# Patient Record
Sex: MALE | Race: BLACK OR AFRICAN AMERICAN
[De-identification: names, ages, dates, MRNs, and addresses within clinical notes are randomized per-mention and may not be internally consistent; named-entity substitution may affect disease eponyms.]

---

## 2018-04-11 ENCOUNTER — HOSPITAL ENCOUNTER (INPATIENT)
Dept: HOSPITAL 92 - ERS | Age: 58
LOS: 2 days | Discharge: HOME | DRG: 54 | End: 2018-04-13
Attending: NEUROLOGICAL SURGERY | Admitting: NEUROLOGICAL SURGERY
Payer: COMMERCIAL

## 2018-04-11 ENCOUNTER — HOSPITAL ENCOUNTER (EMERGENCY)
Dept: HOSPITAL 18 - NAV ERS | Age: 58
Discharge: ANOTHER HOSPITAL | End: 2018-04-11
Payer: COMMERCIAL

## 2018-04-11 VITALS — BODY MASS INDEX: 24.3 KG/M2

## 2018-04-11 DIAGNOSIS — I11.9: ICD-10-CM

## 2018-04-11 DIAGNOSIS — G93.6: Primary | ICD-10-CM

## 2018-04-11 DIAGNOSIS — Z79.899: ICD-10-CM

## 2018-04-11 DIAGNOSIS — R90.0: ICD-10-CM

## 2018-04-11 DIAGNOSIS — J30.9: ICD-10-CM

## 2018-04-11 DIAGNOSIS — G47.30: ICD-10-CM

## 2018-04-11 DIAGNOSIS — I10: ICD-10-CM

## 2018-04-11 DIAGNOSIS — G93.6: ICD-10-CM

## 2018-04-11 DIAGNOSIS — C71.0: Primary | ICD-10-CM

## 2018-04-11 LAB
ALBUMIN SERPL BCG-MCNC: 4.4 G/DL (ref 3.5–5)
ALP SERPL-CCNC: 63 U/L (ref 40–150)
ALT SERPL W P-5'-P-CCNC: 13 U/L (ref 8–55)
ANION GAP SERPL CALC-SCNC: 15 MMOL/L (ref 10–20)
APTT PPP: 29.9 SEC (ref 22.9–36.1)
AST SERPL-CCNC: 16 U/L (ref 5–34)
BASOPHILS # BLD AUTO: 0.1 THOU/UL (ref 0–0.2)
BASOPHILS NFR BLD AUTO: 1.6 % (ref 0–1)
BILIRUB SERPL-MCNC: 1 MG/DL (ref 0.2–1.2)
BUN SERPL-MCNC: 19 MG/DL (ref 8.4–25.7)
CALCIUM SERPL-MCNC: 9.6 MG/DL (ref 7.8–10.44)
CHLORIDE SERPL-SCNC: 108 MMOL/L (ref 98–107)
CK MB SERPL-MCNC: 1.5 NG/ML (ref 0–6.6)
CO2 SERPL-SCNC: 22 MMOL/L (ref 22–29)
CREAT CL PREDICTED SERPL C-G-VRATE: 0 ML/MIN (ref 70–130)
EOSINOPHIL # BLD AUTO: 0 THOU/UL (ref 0–0.7)
EOSINOPHIL NFR BLD AUTO: 0.8 % (ref 0–10)
GLOBULIN SER CALC-MCNC: 3.6 G/DL (ref 2.4–3.5)
GLUCOSE SERPL-MCNC: 114 MG/DL (ref 70–105)
HGB BLD-MCNC: 11.1 G/DL (ref 14–18)
INR PPP: 1.1
LYMPHOCYTES # BLD AUTO: 1.4 THOU/UL (ref 1.2–3.4)
LYMPHOCYTES NFR BLD AUTO: 22.5 % (ref 21–51)
MCH RBC QN AUTO: 27 PG (ref 27–31)
MCV RBC AUTO: 79 FL (ref 80–94)
MONOCYTES # BLD AUTO: 0.5 THOU/UL (ref 0.11–0.59)
MONOCYTES NFR BLD AUTO: 7.4 % (ref 0–10)
NEUTROPHILS # BLD AUTO: 4.1 THOU/UL (ref 1.4–6.5)
NEUTROPHILS NFR BLD AUTO: 67.8 % (ref 42–75)
PLATELET # BLD AUTO: 223 THOU/UL (ref 130–400)
POTASSIUM SERPL-SCNC: 3.5 MMOL/L (ref 3.5–5.1)
PROTHROMBIN TIME: 14 SEC (ref 12–14.7)
RBC # BLD AUTO: 4.1 MILL/UL (ref 4.7–6.1)
SODIUM SERPL-SCNC: 141 MMOL/L (ref 136–145)
TROPONIN I SERPL DL<=0.01 NG/ML-MCNC: (no result) NG/ML (ref ?–0.03)
WBC # BLD AUTO: 6.1 THOU/UL (ref 4.8–10.8)

## 2018-04-11 PROCEDURE — 36416 COLLJ CAPILLARY BLOOD SPEC: CPT

## 2018-04-11 PROCEDURE — 74177 CT ABD & PELVIS W/CONTRAST: CPT

## 2018-04-11 PROCEDURE — 71045 X-RAY EXAM CHEST 1 VIEW: CPT

## 2018-04-11 PROCEDURE — 82553 CREATINE MB FRACTION: CPT

## 2018-04-11 PROCEDURE — 80053 COMPREHEN METABOLIC PANEL: CPT

## 2018-04-11 PROCEDURE — 70450 CT HEAD/BRAIN W/O DYE: CPT

## 2018-04-11 PROCEDURE — 85025 COMPLETE CBC W/AUTO DIFF WBC: CPT

## 2018-04-11 PROCEDURE — 71260 CT THORAX DX C+: CPT

## 2018-04-11 PROCEDURE — C9113 INJ PANTOPRAZOLE SODIUM, VIA: HCPCS

## 2018-04-11 PROCEDURE — 85730 THROMBOPLASTIN TIME PARTIAL: CPT

## 2018-04-11 PROCEDURE — 36415 COLL VENOUS BLD VENIPUNCTURE: CPT

## 2018-04-11 PROCEDURE — 84484 ASSAY OF TROPONIN QUANT: CPT

## 2018-04-11 PROCEDURE — 96374 THER/PROPH/DIAG INJ IV PUSH: CPT

## 2018-04-11 PROCEDURE — 93005 ELECTROCARDIOGRAM TRACING: CPT

## 2018-04-11 PROCEDURE — 96375 TX/PRO/DX INJ NEW DRUG ADDON: CPT

## 2018-04-11 PROCEDURE — A4216 STERILE WATER/SALINE, 10 ML: HCPCS

## 2018-04-11 PROCEDURE — 85610 PROTHROMBIN TIME: CPT

## 2018-04-11 PROCEDURE — 70553 MRI BRAIN STEM W/O & W/DYE: CPT

## 2018-04-11 RX ADMIN — FAMOTIDINE SCH: 10 INJECTION, SOLUTION INTRAVENOUS at 21:16

## 2018-04-11 NOTE — CT
NONCONTRAST HEAD CT:

 

History: Altered mental status. Right sided weakness. 

 

Comparison: 4-11-18 

 

Technique: Noncontrast head CT is performed from skull base to skull vertex.

 

FINDINGS: 

Re-demonstration of intrinsic hyperdensity involving the left and right frontal lobe, compatible with
 areas of hemorrhage. There is evidence of vasogenic edema in the left frontal lobe and to a lesser e
xtent right frontal lobe. Multifocal hemorrhagic metastases are favored. There is asymmetric dilatati
on of the frontal horn of the left lateral ventricle. There may be some intraventricular blood. There
 is evidence of left to right subfalcine herniation anteriorly, measuring 9 mm. Degree of herniation 
has slightly progressed. Basilar cisterns are still patent. There may be a small amount of subarachno
id blood along the left frontal convexities. Calvarium is intact. 

 

IMPRESSION: 

1. Redemonstration of intraparenchymal hemorrhage, vasogenic edema. Findings are worrisome for possib
le metastatic lesions (hemorrhagic mets). 

2. Progression of left to right subfalcine herniation.

3. Results of study discussed with JENNIFER Leslie nurse 4-11-18 at 3:32 p.m.

 

POS: [facility]

## 2018-04-11 NOTE — CT
CT BRAIN:

 

History: 58-year-old with history of weakness and jerking for several hours. 

 

Technique: Noncontrast enhanced CT images of the brain obtained. 

 

FINDINGS: 

Images demonstrate an area of calcification and possible hemorrhage involving the deep white matter o
f both frontal lobes. There is approximately 6.7 mm of left to right shift. There is edema in the allyn
p white matter extending into the left frontal lobe into the subcortical white matter tracts. I am co
ncerned that this is an area of malignancy including glioblastoma. I do not see definite evidence of 
gray matter wedge like density to suggest acute strokes. I do recommend pre and post contrast enhance
d MRI of the brain to further characterize this.

 

Findings discussed with Dr. Matute at 12:58 p.m. on 04/11/2018.

 

IMPRESSION: 

Deep white matter and frontal lobe areas of white matter edema with area of possible calcifications. 
Differential diagnosis includes neoplasia. There is some left to right shift seen. Correlate with pre
 and post contrast enhanced MRI images of the brain.

 

CODE CR

 

POS: [facility]

## 2018-04-11 NOTE — HP
HISTORY OF PRESENT ILLNESS:  Mr. Nicole is a 58-year-old man who was at work today under normal condi
tions and in his normal state of health when coworker and he started to notice that he was not feelin
g particularly well.  He felt a headache and started to have some minor difficulty with speech.  He w
as taken to the emergency department in [facility] wher
e he was then transferred here to [facility] for continued decline in neurologic status and 
EMS transport and towards the end of his stay in [place].  He was experiencing right upper extremity
 and right lower extremity weakness and motor dysfunction.  In the emergency department here, a CT sc
an was obtained that revealed bilateral deep frontal calcifications and edema, which very well could 
represent neoplastic process with calcifications or perhaps parafalcine meningioma or some other type
 of mass.  In any case, there is a left to right shift of around 6 mm greatest distance at the bedsid
e.

 

PAST MEDICAL HISTORY:  Only significant for hypertension, sleep apnea, hypertensive heart disease, an
d allergic rhinitis.

 

CURRENT MEDICATIONS:  Lisinopril/hydrochlorothiazide.

 

ALLERGIES:  No known drug allergies.

 

PAST SURGICAL HISTORY:  None.

 

FAMILY HISTORY:  Father  with cancer and mother alive with hypertension.

 

SOCIAL HISTORY:  Denies tobacco use.

 

PHYSICAL EXAMINATION:  The patient is alert.  He is nonverbal at the moment other than muttering some
 non-intelligible words.  He does not follow many commands, but will squeeze my hand on occasion when
 asked.  He can raise his left leg off the table minimally grading strength about 4-/5.  He is unable
 to raise his right leg.  He has a tremor in the right lower extremity and in the right upper extremi
ty.  He does squeeze my hand with the right upper extremity and the left upper extremity.  Cranial ne
rve VII is intact as is III.  He does not follow commands for my extraocular movement examination, bu
t he is looking around the room on his own accord.  Pupils are equally round and reactive to light.  
There is no deviation of the tongue.  He is diaphoretic at bedside and tachycardic.

 

ASSESSMENT:  Intracranial edema and bilateral frontal lobe abnormality with calcifications versus pos
sible intracerebral hemorrhage, though less likely.

 

PLAN:  At this time, we will obtain an MRI with and without contrast in the emergency department to h
elp us better establish what his intracranial pathology is.  We will also admit him to the critical c
are unit with q.1 hour neuro checks, head of the bed elevated to 30 degrees, and start a regimen of D
ecadron.  I will discuss with Dr. Mcghee as well, adding seizure prophylaxis, given this tremor that 
is presented in the way of Dilantin.  After reviewing the MRI, I will likely involve the hospitalist 
and potentially consult with Oncology depending on pathology.

 

Ady Chávez PA-C, dictating under Dr. Mcghee.

## 2018-04-11 NOTE — MRI
BRAIN MRI WITH AND WITHOUT CONTRAST

4/11/18

 

Reference made to head CT exams from same date. 

 

CLINICAL HISTORY: 

Abnormal intracranial findings with recent reported history of altered mental status and right sided 
weakness. 

 

FINDINGS:  

There is an irregular enhancing mass occupying the left lateral ventricle which contains cystic compo
nent. Mass spans the near entirety of the AP dimension of the body in the left lateral ventricle and 
does result in obstructive  dilatation of the left frontal horn as well as thin linear ependymal enha
ncement of this region. The left frontal horn also demonstrates altered signal of the CSF. Mass is lo
bular in morphology spanning AP dimension of approximately 4 cm when including each of the bilobed co
mponents, and a craniocaudal dimension also spanning approximately 4 cm. There is satellite nodularit
y with rim enhancing nodule seen contralaterally, involving the medial right frontal lobe within the 
level of the centrum semiovale of 6-7 mm. There is extensive vasogenic edema more notable within the 
left cerebral hemisphere which occupies the frontoparietal lobes. There is mild increased signal with
in the region of signal abnormality on DWI indicating component of mild restriction, although there i
s significant component of T2 shine through as well. Susceptibility foci are seen corresponding to hy
perdensity on preceding CT favoring calcific component, particularly at and to the left of midline gi
lila the CT appearance. There is rightward subfalcine herniation approximately 7-8 mm to the right of 
midline as direct result of mass effect. 

 

IMPRESSION:  

Large cystic and solid mass, epicenter within left lateral ventricle, with associated obstructive dil
atation and ependymitis involving the left frontal horn. Satellite nodularity is seen contralaterally
. There is extensive vasogenic edema involving the bilateral cerebral hemispheres. Given intrinsic hy
perdensity on preceding CT, as well as susceptibility on the current exam, this favors a calcific com
ponent. As a result, malignancy is the diagnosis of exclusion, which may either relate to entity such
 as intraventricular GBM, or choroid plexus carcinoma. Metastatic disease is a consideration. Infecti
ous process is considered less likely although is not entirely excluded, particularly in light of the
 linear enhancement indicating ependymitis of the frontal horn left lateral ventricle, and the small 
ring enhancing lesion. Recommend clinical correlation in this regard. Due to the degree of intracrani
al mass effect, and significant edema, urgent neurosurgical consultation is warranted. 

 

POS: ADILIA

## 2018-04-12 RX ADMIN — FAMOTIDINE SCH: 10 INJECTION, SOLUTION INTRAVENOUS at 11:16

## 2018-04-12 RX ADMIN — Medication SCH ML: at 09:45

## 2018-04-12 NOTE — PRG
DATE OF SERVICE:  04/12/2018

 

This is a 50 minute initial hospital visit note in which 50 minutes were spent in review the imaging,
 record, evaluation, examination of the patient, and formulation of a plan.  Greater than 50% of the 
time was spent in counseling on Andre Nicole.  

 

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

 

HISTORY OF PRESENT ILLNESS:  Mr. Nicole is 58-year-old man with the abrupt onset of weakness in the r
ight upper extremity and right lower extremity.  He also had speech abnormality.  Head CT demonstrate
d an isodense mass deep in the region of the genun body of the corpus callosum and a small amount of 
hyperdensity and calcium.  MRI demonstrated some evidence of hemorrhage, but more of findings of mult
ifocal and multicentric contrast enhancing lesion with significant left greater than right edema.  He
 has improved on Decadron.  My suspicion is this is likely a secondary glioma that has transitioned f
rom perhaps an oligodendroglioma given the calcification to a glioblastoma.  There is also enhancemen
t along the ependyma and mass effect.  He has developed a tremor as well.  There was concern that the
re was a seizure and he has been placed on Keppra, which we have not seen any clinical seizure activi
ty.

 

PHYSICAL EXAMINATION:

GENERAL:  He is alert, appropriate.  His speech appears to be fluent.  He is antigravity in both uppe
r and lower extremities with mild weakness in the right upper and lower extremity.  This is certainly
 an improvement according to he and his family.

 

IMPRESSION AND PLAN:  He has been on aspirin.  This is not a surgically resectable lesion, but perhap
s amenable to stereotactic needle biopsy; however, he would need to be off his aspirin given the rece
nt hemorrhage and if he does well over the course of today I will likely do a CT scan of the chest, a
bdomen, and pelvis today to make sure there is not a metastatic source, although I let him know that 
the 3 lesions that mainly involve the corpus callosum are the glioblastoma lymphoma and tumefactive M
S.

 

My suspicion is again this is a glioblastoma.  He is likely too young for lymphoma and 1 old for mult
iple sclerosis.  I would like to plan another night in the hospital and if he is doing okay plan to d
ismiss him with follow up in my clinic.

 

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

## 2018-04-12 NOTE — CT
CT OF THE CHEST WITH CONTRAST 

CT OF THE ABDOMEN AND PELVIS WITH CONTRAST:

 

Date:  04/12/18 

 

COMPARISON:  

04/11/18. 

 

HISTORY:  

Intracranial mass. This could represent a primary neoplasm of the brain or metastatic disease to the 
brain. Evaluate for primary tumor in the chest, abdomen, or pelvis. 

 

TECHNIQUE:  

1.  Multiple contiguous axial images were obtained in a CT of the chest with contrast. Coronal reform
ats were performed. 

 

2.  Multiple contiguous axial images were obtained in a CT of the abdomen and pelvis with contrast. P
O contrast was administered. Coronal reformats were performed. 

 

FINDINGS:

 

CT CHEST:

The heart is normal in size without focal cardiac abnormality. No hilar or mediastinal lymphadenopath
y seen. No suspicious pulmonary nodules are seen. No pneumothorax or pleural effusions are seen. The 
chest wall soft tissues are unremarkable. The bones of the thorax are unremarkable. 

 

CT ABDOMEN/PELVIS:

The liver, gallbladder, kidneys, adrenal glands, spleen, and pancreas are unremarkable. No free air, 
free fluid, or stranding changes are seen in the abdomen or pelvis. 

 

The large and small bowel are unremarkable. The appendix is unremarkable. No abdominal or pelvic lymp
hadenopathy are seen. 

 

There is a mixed sclerotic/lytic lesion in the L2 vertebral body which may represent end plate degene
rative changes. No other significant osseous lesions are identified. A small bone island is seen in t
he left pelvis. 

 

The abdominal wall soft tissues are unremarkable. 

 

IMPRESSION: 

1.  No evidence of intrathoracic primary malignancy. 

2.  No evidence of acute intraabdominal/pelvic abnormality. 

3.  There is a lesion in the L2 vertebral body which likely represents end plate degenerative change,
 but this is nonspecific on CT. 

 

 

POS: [facility]ILIA

## 2018-04-12 NOTE — CON
DATE OF CONSULTATION:  04/12/2018

 

This is a 58-year-old pleasant  gentleman from [place] who presented with slurred sp
eech, weakness of his right side.

 

His initial CT of his brain showed areas of hemorrhage, both frontal lobes with some edema.  Most of 
it appeared in the left frontal lobe area.

 

He then underwent an MRI of his brain which showed a cystic solid mass in the left lateral ventricle 
associated obstructive dilatation.  Extensive vasogenic edema involving bilateral cerebral hemisphere
s.

 

He was then started on Dilantin.

 

There was some concern that this could be either primary versus metastatic disease.  The patient is a
 nonsmoker, no alcohol abuse.  He is relatively asymptomatic, in fact, I remember the last time he wa
s in the hospital.

 

PAST MEDICAL HISTORY:  Hypertension, sleep apnea, noncompliant.

 

MEDICATIONS:  Lisinopril.  Wife states CPAP machine at home unused for years.

 

Though he did see a sleep doctor somewhere down the line.   Unclear where he saw that.

 

SOCIAL HISTORY:  He is an  in [place].

 

REVIEW OF SYSTEMS:  Otherwise, 10 point negative.

 

PHYSICAL EXAMINATION:

GENERAL:  Awake, alert, responsive.  Speech is slightly slurred neurologically.

HEENT:  Pupils are equal.  He moves all 4 extremities.  

EXTREMITIES:  No edema.

VITAL SIGNS:  Sats are 100% on room air, blood pressure 124/81, respiration rate 18, afebrile.

CHEST:  Reveals no wheezing or crackles.

CARDIAC:  Normal S1-S2.  No gallops.

ABDOMEN:  Soft.  No masses.  

 

LABORATORY:  Creatinine 1.32.  White count 6,  H&H 11 and 32.  Electrolytes are normal.  

 

X-ray was normal.  MRI as noted. 

 

IMPRESSION:

1.  Extensive cerebral edema associated with presumably a neoplastic process.

2.  Nonsmoker with hypertension.

3.  Mild renal failure.

 

PLAN:  Continue Decadron.  Continue supportive care.  Keppra.  Await input from Neurosurgery.  We darleen
l follow while in the ICU.

## 2018-04-12 NOTE — CON
DATE OF CONSULTATION:  2018

 

REASON FOR CONSULTATION:  Mr. Nicole is a 58-year-old gentleman who has been diagnosed with a maligna
nt appearing left side a brain mass.  I have been asked by Dr. Mcghee to see him to discuss diagnosis
 and possible treatment options.

 

HISTORY OF PRESENT ILLNESS:  Mr. Nicole was doing well until yesterday at work, he noticed that he wa
s not thinking well.  He apparently was having more difficulty with his speech.  He also started to d
evelop some right-sided motor dysfunction and weakness.  He was brought to the emergency room at [facility] and he underwent a CT scan of the head, which showed a mass that had some areas of calcification
 and possible hemorrhage.  He was subsequently admitted to [facility] here for workup and evaluation.
  He was started on dexamethasone.  His symptoms did improve.  He underwent an MRI of the brain, whic
h showed an irregular enhancing mass occupying the left lateral ventricle.  There was some dilatation
 of the left frontal horn as well as a thin linear ependymal enhancement in that region.  The mass ap
peared to cross the corpus callosum.  There were satellite nodularity on the contralateral side.  The
re was significant vasogenic edema.  Concern was for a malignant brain tumor.  The patient was seen b
y Dr. Mcghee.  A CT scan of the chest, abdomen, and pelvis were performed, which showed no evidence o
f primary site.  There was a sclerotic focus in the L2 vertebral body, which was felt to be from dege
nerative changes.  Dr. Mcghee has talked to him about a possible sterotactic needle biopsy.  I am see
ing the patient today to discussed his options also.  He is much improved with the dexamethasone.  He
 was having tremors and these are improved both with the Keppra and dexamethasone.  He states that he
 is able to walk now.  He still has some difficulties with his speech, but does express himself fairl
y clearly.  He denies any headaches prior to his admission.  He has no nausea or vomiting.  He voices
 no other complaints.

 

PAST MEDICAL HISTORY:

1.  Hypertension.

2.  He denies other medical or surgical problems.

 

MEDICATIONS:  On admission were lisinopril.  He now is on dexamethasone and Keppra, and Protonix.

 

ALLERGIES:  No known medical allergies.

 

SOCIAL HISTORY:  He lives with his wife in [place], [place].  He works as a  for [employer] in [place].  He has no cigarette or alcohol use.

 

FAMILY HISTORY:  His father  at age 63 from what was possibly metastatic colon cancer.  His mothe
r still living at age 89 with some heart condition.

 

REVIEW OF SYSTEMS:  Twelve system review of systems otherwise negative.

 

PHYSICAL EXAMINATION:

VITAL SIGNS:  Height 6 feet, weight 179 pounds, blood pressure is 141/100, pulse is 108, respirations
 are 20, temperature 98.4, O2 saturation 97%.

GENERAL:  He is alert and oriented and in no apparent distress.  He is well-developed and well-nouris
hed.  Karnofsky performance status is an 80%.

HEENT:  Eyes:  Pupils are equal, round, reactive to light.  Extraocular movements are intact.  ENT:  
Oral cavity and oropharynx normal without lesion or erythema.  Palate elevates symmetrically.  Gingiv
a is intact.

NECK:  Supple, without cervical or supraclavicular adenopathy.  No thyromegaly.  Larynx midline.

LUNGS:  Breathing nonlabored.  Clear to auscultation and percussion.

HEART:  Regular rhythm without murmur.  No lower extremity edema.

BACK:  No tenderness on fist percussion of his spine.

LYMPHATIC:  No axillary or inguinal adenopathy.

ABDOMEN:  Bowel sounds present.  Soft, nontender, nondistended, without mass or hepatosplenomegaly.  
Liver percussed to normal size.

SKIN:  Without rash or purpura.

NEUROLOGIC:  Cranial nerves II-XII grossly intact.  Motor strength is 5/5 in both upper and lower ext
remities in all muscle groups tested.  He does have still some occasional tremors in the right upper 
extremity.  He is hyperreflexic on the right side.  Gait was not tested.

 

RADIOLOGIC:  MRI of the brain is personally reviewed and discussed above.  He has an enhancing mass t
hat is deep in the left cerebrum and crossing the corpus callosum.  He has some satellite nodularity 
on the contralateral side.  He does have some ependymal enhancement in the left frontal horn.  CT sca
n of the head was personally reviewed and again shows some calcification and possible hemorrhage.  CT
 of the chest, abdomen, and pelvis was also personally reviewed, which showed no evidence of primary 
site.  He has cirrhotic focus in the L2 vertebral body which is felt to be degenerative in nature.

 

LABORATORY DATA:  CBC revealed a white blood count of 6100 with hemoglobin of 11.1, hematocrit of 32.
4, platelet count 223,000.  Chemistry group showed a creatinine of 1.32.  Otherwise, electrolytes wer
e normal.

 

ASSESSMENT:  Mr. Nicole is a 58-year-old gentleman who appears to have a malignancy involving the bra
in.  Likely this is a primary brain tumor.  Given that there is no evidence of other primary site on 
his staging workup.  Possibilities include glioblastoma versus a lymphoma.  Dr. Mcghee suggest there 
is a small chance this could be from multiple sclerosis, although he thinks that is not likely the ca
se.  By imaging, this does appear to be a primary brain tumor.  His symptoms are better with the init
iation of dexamethasone.

 

PLAN:  I had a discussion with Mr. Nicole and his wife regarding his situation and probable diagnosis
.  I agree with Dr. Mcghee that we need to establish tissue diagnosis.  Dr. Mcghee is planning a ster
otactic biopsy.  He is not a candidate for surgical resection because the lesion is too deep.  A ster
otactic biopsy would help us to determine a diagnosis.  We can then determine how best to treat him. 
 If this is a glioblastoma that he is likely going to be treated with chemotherapy and radiation.  If
 this were a lymphoma, then he may be treated with chemotherapy first.  Possibly followed by deb
n.  Of course, if this were a benign then he would not be treated with chemotherapy, radiation, but i
s very unlikely that is the case.  I had a discussion with him and his wife regarding the possibility
 of radiation, the logistics of radiation, and the benefits and risks of potentially.  This is not th
e benefits and risks were not discussed in depth as we have not yet made a tissue diagnosis.  However
, we did discuss the logistics of radiation in depth.  He lives in [place].  It is his preference to
 get his radiation therapy in [place].  Since that is much closer to his house, if it does come
 to needing that as it likely will.  Time was taken to answer all their questions regarding his situa
tion and possible treatment options in the future.

 

Thank you for this interesting consultation.

## 2018-04-13 VITALS — SYSTOLIC BLOOD PRESSURE: 152 MMHG | DIASTOLIC BLOOD PRESSURE: 86 MMHG

## 2018-04-13 VITALS — TEMPERATURE: 98.6 F

## 2018-04-13 RX ADMIN — Medication SCH ML: at 09:59

## 2018-04-13 NOTE — PRG
DATE OF SERVICE:  04/13/2018

 

This is a 15-minute subsequent visit note in which 15 minutes were spent in reviewing the imaging rec
ord, evaluation, examination of patient, and formulation of plan.  Greater than 50% of the time was s
pent counseling on Mr. Andre Nicole.  Mr. Nicole is hospital day #2, following admission for what I 
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

 

He has very minimal weakness in the right upper and lower extremity.  This is definitely improved wit
h the initiation of Decadron.  Dr. Robbins has seen him and has recommended that in order to pursue adj
uvant radiation and chemotherapy, then that biopsy would obviously need to be done.  If it is done, I
 would recommend left-sided likely frontal approach for stereotactic needle biopsy.  The patient is tereza Cash and he is to let me know he would like to pursue his radiation and chemo treatment close
r to home and I certainly do not blame him.  I have also discussed pursuing neurosurgical evaluation 
closer to home such as in [place].  He is the main  for his wife, but there is concern of
 a seizure over the last couple days, which has not been present since the initiation of Keppra and a
s such, he should not drive at this time.  His wife does not drive obviously making transportation qu
ite difficult.  I should note a CT scan of the chest, abdomen, and pelvis is negative for any metasta
tic source.  I am not surprised.  I have let the patient know I would be fine with him being dismisse
d today on Decadron.  I think that he should contact his primary care physician.  We would be happy t
o assist in this regard as far as getting the patient seen by Neurosurgery closer to home, perhaps as
 early as next week.  Nevertheless, I would be happy to see the patient next week in my clinic as wel
l.

 

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

## 2018-04-13 NOTE — PRG
DATE OF SERVICE:  04/13/2018

 

This morning, awake, alert, responsive. 

 

PHYSICAL EXAMINATION: 

VITAL SIGNS:  Sats 97 on room air, pulse 106, blood pressure 176/92.

CHEST:  Chest revealed decreased breath sounds, no wheezing.

CARDIAC:  Normal S1, S2.  No gallops.

ABDOMEN:  Soft.  No masses.

 

IMPRESSION:  Abnormal MRI suggestive of primary brain tumor.  On Decadron.

 

The patient apparently is going to be discharged home.  Follow up with neurosurgery regarding treatme
nt and disposition.  Pulmonary will follow at a distance.